# Patient Record
Sex: FEMALE | Employment: OTHER | URBAN - METROPOLITAN AREA
[De-identification: names, ages, dates, MRNs, and addresses within clinical notes are randomized per-mention and may not be internally consistent; named-entity substitution may affect disease eponyms.]

---

## 2017-12-28 ENCOUNTER — HOSPITAL ENCOUNTER (EMERGENCY)
Facility: CLINIC | Age: 67
Discharge: HOME | End: 2017-12-28
Attending: EMERGENCY MEDICINE | Admitting: EMERGENCY MEDICINE
Payer: COMMERCIAL

## 2017-12-28 ENCOUNTER — APPOINTMENT (OUTPATIENT)
Dept: GENERAL RADIOLOGY | Facility: CLINIC | Age: 67
End: 2017-12-28
Attending: EMERGENCY MEDICINE
Payer: COMMERCIAL

## 2017-12-28 VITALS
BODY MASS INDEX: 25.69 KG/M2 | WEIGHT: 154.2 LBS | HEIGHT: 65 IN | TEMPERATURE: 97.9 F | OXYGEN SATURATION: 97 % | RESPIRATION RATE: 16 BRPM | DIASTOLIC BLOOD PRESSURE: 72 MMHG | SYSTOLIC BLOOD PRESSURE: 151 MMHG | HEART RATE: 63 BPM

## 2017-12-28 DIAGNOSIS — K59.00 CONSTIPATION, UNSPECIFIED CONSTIPATION TYPE: ICD-10-CM

## 2017-12-28 DIAGNOSIS — F03.91 DEMENTIA WITH BEHAVIORAL DISTURBANCE, UNSPECIFIED DEMENTIA TYPE: ICD-10-CM

## 2017-12-28 LAB
ALBUMIN SERPL-MCNC: 3.4 G/DL (ref 3.4–5)
ALBUMIN UR-MCNC: NEGATIVE MG/DL
ALP SERPL-CCNC: 85 U/L (ref 40–150)
ALT SERPL W P-5'-P-CCNC: 15 U/L (ref 0–50)
ANION GAP SERPL CALCULATED.3IONS-SCNC: 6 MMOL/L (ref 3–14)
APPEARANCE UR: CLEAR
AST SERPL W P-5'-P-CCNC: 12 U/L (ref 0–45)
BACTERIA #/AREA URNS HPF: ABNORMAL /HPF
BASOPHILS # BLD AUTO: 0 10E9/L (ref 0–0.2)
BASOPHILS NFR BLD AUTO: 0.6 %
BILIRUB SERPL-MCNC: 0.8 MG/DL (ref 0.2–1.3)
BILIRUB UR QL STRIP: NEGATIVE
BUN SERPL-MCNC: 10 MG/DL (ref 7–30)
CALCIUM SERPL-MCNC: 9.3 MG/DL (ref 8.5–10.1)
CHLORIDE SERPL-SCNC: 108 MMOL/L (ref 94–109)
CO2 SERPL-SCNC: 28 MMOL/L (ref 20–32)
COLOR UR AUTO: YELLOW
CREAT SERPL-MCNC: 1 MG/DL (ref 0.52–1.04)
DIFFERENTIAL METHOD BLD: NORMAL
EOSINOPHIL # BLD AUTO: 0.1 10E9/L (ref 0–0.7)
EOSINOPHIL NFR BLD AUTO: 1.1 %
ERYTHROCYTE [DISTWIDTH] IN BLOOD BY AUTOMATED COUNT: 13.3 % (ref 10–15)
GFR SERPL CREATININE-BSD FRML MDRD: 55 ML/MIN/1.7M2
GLUCOSE SERPL-MCNC: 116 MG/DL (ref 70–99)
GLUCOSE UR STRIP-MCNC: NEGATIVE MG/DL
HCT VFR BLD AUTO: 39 % (ref 35–47)
HGB BLD-MCNC: 12.8 G/DL (ref 11.7–15.7)
HGB UR QL STRIP: NEGATIVE
IMM GRANULOCYTES # BLD: 0 10E9/L (ref 0–0.4)
IMM GRANULOCYTES NFR BLD: 0.2 %
KETONES UR STRIP-MCNC: NEGATIVE MG/DL
LEUKOCYTE ESTERASE UR QL STRIP: ABNORMAL
LIPASE SERPL-CCNC: 136 U/L (ref 73–393)
LYMPHOCYTES # BLD AUTO: 2 10E9/L (ref 0.8–5.3)
LYMPHOCYTES NFR BLD AUTO: 38.3 %
MCH RBC QN AUTO: 28.7 PG (ref 26.5–33)
MCHC RBC AUTO-ENTMCNC: 32.8 G/DL (ref 31.5–36.5)
MCV RBC AUTO: 87 FL (ref 78–100)
MONOCYTES # BLD AUTO: 0.4 10E9/L (ref 0–1.3)
MONOCYTES NFR BLD AUTO: 7.9 %
MUCOUS THREADS #/AREA URNS LPF: PRESENT /LPF
NEUTROPHILS # BLD AUTO: 2.7 10E9/L (ref 1.6–8.3)
NEUTROPHILS NFR BLD AUTO: 51.9 %
NITRATE UR QL: NEGATIVE
NRBC # BLD AUTO: 0 10*3/UL
NRBC BLD AUTO-RTO: 0 /100
PH UR STRIP: 7 PH (ref 5–7)
PLATELET # BLD AUTO: 207 10E9/L (ref 150–450)
POTASSIUM SERPL-SCNC: 3.9 MMOL/L (ref 3.4–5.3)
PROT SERPL-MCNC: 7.6 G/DL (ref 6.8–8.8)
RBC # BLD AUTO: 4.46 10E12/L (ref 3.8–5.2)
RBC #/AREA URNS AUTO: 3 /HPF (ref 0–2)
SODIUM SERPL-SCNC: 142 MMOL/L (ref 133–144)
SOURCE: ABNORMAL
SP GR UR STRIP: 1.01 (ref 1–1.03)
SQUAMOUS #/AREA URNS AUTO: 2 /HPF (ref 0–1)
TRANS CELLS #/AREA URNS HPF: 2 /HPF (ref 0–1)
TSH SERPL DL<=0.005 MIU/L-ACNC: 0.4 MU/L (ref 0.4–4)
UROBILINOGEN UR STRIP-MCNC: NORMAL MG/DL (ref 0–2)
WBC # BLD AUTO: 5.2 10E9/L (ref 4–11)
WBC #/AREA URNS AUTO: 81 /HPF (ref 0–2)

## 2017-12-28 PROCEDURE — 71020 XR CHEST 2 VW: CPT

## 2017-12-28 PROCEDURE — 74020 XR ABDOMEN 2 VW: CPT

## 2017-12-28 PROCEDURE — 96361 HYDRATE IV INFUSION ADD-ON: CPT | Performed by: EMERGENCY MEDICINE

## 2017-12-28 PROCEDURE — 81001 URINALYSIS AUTO W/SCOPE: CPT | Performed by: EMERGENCY MEDICINE

## 2017-12-28 PROCEDURE — 25000132 ZZH RX MED GY IP 250 OP 250 PS 637: Performed by: EMERGENCY MEDICINE

## 2017-12-28 PROCEDURE — 87086 URINE CULTURE/COLONY COUNT: CPT | Performed by: EMERGENCY MEDICINE

## 2017-12-28 PROCEDURE — 85025 COMPLETE CBC W/AUTO DIFF WBC: CPT | Performed by: EMERGENCY MEDICINE

## 2017-12-28 PROCEDURE — 83690 ASSAY OF LIPASE: CPT | Performed by: EMERGENCY MEDICINE

## 2017-12-28 PROCEDURE — 99284 EMERGENCY DEPT VISIT MOD MDM: CPT | Mod: 25 | Performed by: EMERGENCY MEDICINE

## 2017-12-28 PROCEDURE — 96360 HYDRATION IV INFUSION INIT: CPT | Performed by: EMERGENCY MEDICINE

## 2017-12-28 PROCEDURE — 99284 EMERGENCY DEPT VISIT MOD MDM: CPT | Mod: Z6 | Performed by: EMERGENCY MEDICINE

## 2017-12-28 PROCEDURE — 25000128 H RX IP 250 OP 636: Performed by: EMERGENCY MEDICINE

## 2017-12-28 PROCEDURE — 84443 ASSAY THYROID STIM HORMONE: CPT | Performed by: EMERGENCY MEDICINE

## 2017-12-28 PROCEDURE — 80053 COMPREHEN METABOLIC PANEL: CPT | Performed by: EMERGENCY MEDICINE

## 2017-12-28 RX ORDER — SULFAMETHOXAZOLE/TRIMETHOPRIM 800-160 MG
1 TABLET ORAL ONCE
Status: COMPLETED | OUTPATIENT
Start: 2017-12-28 | End: 2017-12-28

## 2017-12-28 RX ORDER — AMOXICILLIN 250 MG
1 CAPSULE ORAL 2 TIMES DAILY
Qty: 100 TABLET | Refills: 0 | Status: SHIPPED | OUTPATIENT
Start: 2017-12-28

## 2017-12-28 RX ORDER — AMOXICILLIN 250 MG
1 CAPSULE ORAL DAILY PRN
COMMUNITY
End: 2017-12-28

## 2017-12-28 RX ORDER — SENNOSIDES 8.6 MG
8.6 TABLET ORAL ONCE
Status: DISCONTINUED | OUTPATIENT
Start: 2017-12-28 | End: 2017-12-28 | Stop reason: HOSPADM

## 2017-12-28 RX ORDER — SULFAMETHOXAZOLE/TRIMETHOPRIM 800-160 MG
1 TABLET ORAL 2 TIMES DAILY
Qty: 14 TABLET | Refills: 0 | Status: SHIPPED | OUTPATIENT
Start: 2017-12-28

## 2017-12-28 RX ADMIN — SODIUM CHLORIDE 1000 ML: 900 INJECTION, SOLUTION INTRAVENOUS at 10:52

## 2017-12-28 RX ADMIN — SULFAMETHOXAZOLE AND TRIMETHOPRIM 1 TABLET: 800; 160 TABLET ORAL at 14:12

## 2017-12-28 ASSESSMENT — ENCOUNTER SYMPTOMS
APPETITE CHANGE: 1
VOMITING: 0
NAUSEA: 0
BLOOD IN STOOL: 0
ANAL BLEEDING: 0
DIARRHEA: 0
ABDOMINAL PAIN: 0

## 2017-12-28 NOTE — LETTER
Transition Communication Hand-off for Care Transitions to Next Level of Care Provider    Name: Ghazala Maharaj  MRN #: [id number]  Primary Care Provider: Goldie Thakur     Primary Clinic: [address]  [facility] [address]     Reason for Hospitalization:  dementia with behavioral disturbance  Admit Date/Time: 12/28/2017 10:24 AM  Discharge Date: 12/28/17  Payor Source: Payor: [employer] / Plan: [employer] / Product Type: POS /        Reason for Communication Hand-off Referral: Fragility  Other - progressive dementia, care needs increasing    Discharge Plan:  FYI - Ghazala was brought into [facility] ED by her sisters for concerns that she had stopped eating and drinking.  Her sister Priyanka noted that Ghazala is refusing to eat. Family is caring for her at home but it is becoming more difficult.  She was medically stable for d/c to home, able to take oral medication in the ED. MD ordered home care to evaluate home feeding.       ASPEN Martinez, [facility]  Social Work Services, Emergency Dept [facility]  Pager: [phone] Mon-Sat 9 am - 9 pm, on-call/after hours pager [phone]    AVS/Discharge Summary is the source of truth; this is a helpful guide for improved communication of patient story

## 2017-12-28 NOTE — ED PROVIDER NOTES
"  History     Chief Complaint   Patient presents with     Anorexia     HPI  Ghazala Maharaj is a 67 year old female who presents to the emergency department with her sister for evaluation of decreased PO intake.  Apparently the patient is demented and lives in a care facility.  The patient has been noted to have decreased PO intake and according to the daughter has not eaten much over the last 2 weeks.  The last time the patient took any food was approximately 4 days ago.  The patient still has been urinating daily and denies any pain, vomiting, diarrhea, melena, bright red blood per rectum.  The patient was brought into the ER for further evaluation.    I have reviewed the Medications, Allergies, Past Medical and Surgical History, and Social History in the [employer] system.    Past Medical History:   Diagnosis Date     Dementia      Hypertension        History reviewed. No pertinent surgical history.    No family history on file.    Social History   Substance Use Topics     Smoking status: Never Smoker     Smokeless tobacco: Never Used     Alcohol use No       Previous Medications    CALCIUM-VITAMIN D (CALTRATE) 600-400 MG-UNIT PER TABLET    Take 1 tablet by mouth daily    CALCIUM-VITAMIN D 500-125 MG-UNIT TABS    Take 2,000 Units by mouth    DONEPEZIL HCL PO    Take 10 mg by mouth daily (with breakfast)    LOSARTAN POTASSIUM PO    Take 100 mg by mouth daily    METOPROLOL SUCCINATE ER PO    Take 25 mg by mouth daily      No Known Allergies     Review of Systems   Constitutional: Positive for appetite change (Decreased PO intake for 2 weeks).   Cardiovascular: Negative for chest pain.   Gastrointestinal: Negative for abdominal pain, anal bleeding, blood in stool, diarrhea, nausea and vomiting.   All other systems reviewed and are negative.      Physical Exam   BP: 159/69  Pulse: 63  Temp: 97.9  F (36.6  C)  Resp: 16  Height: 165.1 cm (5' 5\")  Weight: 69.9 kg (154 lb 3.2 oz)  SpO2: 97 %      Physical Exam   Constitutional: "   Cooperative and calm but noncommunicative   HENT:   Head: Atraumatic.   Eyes: EOM are normal. Pupils are equal, round, and reactive to light.   Neck: Neck supple.   Cardiovascular: Normal heart sounds.    Pulmonary/Chest: Breath sounds normal.   Abdominal: Soft. There is no tenderness. There is no rebound.   Musculoskeletal: She exhibits no edema, tenderness or deformity.   Neurological:   Grossly symmetric   Skin: No rash noted.   Psychiatric:   Unable to assess secondary to her level of dementia       ED Course     ED Course     Procedures          Results for orders placed or performed during the hospital encounter of 12/28/17   XR Chest 2 Views    Narrative    XR CHEST 2 VW  12/28/2017 11:11 AM      HISTORY: weakness;     COMPARISON: 10/14/2005    FINDINGS: PA and lateral views of the chest. No acute airspace  opacities. No pneumothorax or pleural effusion. Heart size is normal.      Impression    IMPRESSION: No acute cardiopulmonary findings.    I have personally reviewed the examination and initial interpretation  and I agree with the findings.    EDWIN ECHEVERRIA MD ([place])   Abdomen XR, 2 vw, flat and upright    Narrative    Exam: Abdominal x-ray, 2 views, 12/28/2017.    COMPARISON: No similar study.    HISTORY: Decreased appetite.    FINDINGS: Upright and supine views of the abdomen were obtained.  Nonobstructive bowel gas pattern. Moderate stool burden throughout the  colon. Pelvic phleboliths. No pneumatosis. No portal venous gas. No  free air beneath the diaphragm. Bilateral lower lobes atelectasis  versus consolidation, left greater than right. Degenerative changes of  the spine and hips.      Impression    IMPRESSION:  1. Nonobstructive bowel gas pattern with moderate stool burden  throughout the colon.  2. Bilateral lower lobes atelectasis versus consolidation, left  greater than right.    ARUN LUNDBERG MD   CBC with platelets differential   Result Value Ref Range    WBC 5.2 4.0 - 11.0 10e9/L     RBC Count 4.46 3.8 - 5.2 10e12/L    Hemoglobin 12.8 11.7 - 15.7 g/dL    Hematocrit 39.0 35.0 - 47.0 %    MCV 87 78 - 100 fl    MCH 28.7 26.5 - 33.0 pg    MCHC 32.8 31.5 - 36.5 g/dL    RDW 13.3 10.0 - 15.0 %    Platelet Count 207 150 - 450 10e9/L    Diff Method Automated Method     % Neutrophils 51.9 %    % Lymphocytes 38.3 %    % Monocytes 7.9 %    % Eosinophils 1.1 %    % Basophils 0.6 %    % Immature Granulocytes 0.2 %    Nucleated RBCs 0 0 /100    Absolute Neutrophil 2.7 1.6 - 8.3 10e9/L    Absolute Lymphocytes 2.0 0.8 - 5.3 10e9/L    Absolute Monocytes 0.4 0.0 - 1.3 10e9/L    Absolute Eosinophils 0.1 0.0 - 0.7 10e9/L    Absolute Basophils 0.0 0.0 - 0.2 10e9/L    Abs Immature Granulocytes 0.0 0 - 0.4 10e9/L    Absolute Nucleated RBC 0.0    Comprehensive metabolic panel   Result Value Ref Range    Sodium 142 133 - 144 mmol/L    Potassium 3.9 3.4 - 5.3 mmol/L    Chloride 108 94 - 109 mmol/L    Carbon Dioxide 28 20 - 32 mmol/L    Anion Gap 6 3 - 14 mmol/L    Glucose 116 (H) 70 - 99 mg/dL    Urea Nitrogen 10 7 - 30 mg/dL    Creatinine 1.00 0.52 - 1.04 mg/dL    GFR Estimate 55 (L) >60 mL/min/1.7m2    GFR Estimate If Black 67 >60 mL/min/1.7m2    Calcium 9.3 8.5 - 10.1 mg/dL    Bilirubin Total 0.8 0.2 - 1.3 mg/dL    Albumin 3.4 3.4 - 5.0 g/dL    Protein Total 7.6 6.8 - 8.8 g/dL    Alkaline Phosphatase 85 40 - 150 U/L    ALT 15 0 - 50 U/L    AST 12 0 - 45 U/L   Lipase   Result Value Ref Range    Lipase 136 73 - 393 U/L   TSH   Result Value Ref Range    TSH 0.40 0.40 - 4.00 mU/L   UA with Microscopic reflex to Culture   Result Value Ref Range    Color Urine Yellow     Appearance Urine Clear     Glucose Urine Negative NEG^Negative mg/dL    Bilirubin Urine Negative NEG^Negative    Ketones Urine Negative NEG^Negative mg/dL    Specific Gravity Urine 1.008 1.003 - 1.035    Blood Urine Negative NEG^Negative    pH Urine 7.0 5.0 - 7.0 pH    Protein Albumin Urine Negative NEG^Negative mg/dL    Urobilinogen mg/dL Normal 0.0 - 2.0  mg/dL    Nitrite Urine Negative NEG^Negative    Leukocyte Esterase Urine Large (A) NEG^Negative    Source Midstream Urine     WBC Urine 81 (H) 0 - 2 /HPF    RBC Urine 3 (H) 0 - 2 /HPF    Bacteria Urine Few (A) NEG^Negative /HPF    Squamous Epithelial /HPF Urine 2 (H) 0 - 1 /HPF    Transitional Epi 2 (H) 0 - 1 /HPF    Mucous Urine Present (A) NEG^Negative /LPF   Urine Culture Aerobic Bacterial   Result Value Ref Range    Specimen Description Unspecified Urine     Special Requests Specimen received in preservative     Culture Micro PENDING        Labs Ordered and Resulted from Time of ED Arrival Up to the Time of Departure from the ED   COMPREHENSIVE METABOLIC PANEL - Abnormal; Notable for the following:        Result Value    Glucose 116 (*)     GFR Estimate 55 (*)     All other components within normal limits   ROUTINE UA WITH MICROSCOPIC REFLEX TO CULTURE - Abnormal; Notable for the following:     Leukocyte Esterase Urine Large (*)     WBC Urine 81 (*)     RBC Urine 3 (*)     Bacteria Urine Few (*)     Squamous Epithelial /HPF Urine 2 (*)     Transitional Epi 2 (*)     Mucous Urine Present (*)     All other components within normal limits   CBC WITH PLATELETS DIFFERENTIAL   LIPASE   TSH   PERIPHERAL IV CATHETER   URINE CULTURE AEROBIC BACTERIAL         Assessments & Plan (with Medical Decision Making)     I have reviewed the nursing notes.    Patient was seen by our  in the ER for her needs and level of care at home.  Meanwhile the patient's medical workup revealed the patient to be constipated and with possibly a UTI.  The patient was able to take oral medication here in the ER.  Medical evaluation revealed no significant dehydration indicating the patient has been taking orally at home over the last 2 weeks.  Treatment of her constipation and UTI may result in an increased appetite and this was communicated with the patient's family member.    I have reviewed the findings, diagnosis, plan and need  for follow up with the patient.    Patient's Medications   New Prescriptions    SULFAMETHOXAZOLE-TRIMETHOPRIM (BACTRIM DS/SEPTRA DS) 800-160 MG PER TABLET    Take 1 tablet by mouth 2 times daily   Previous Medications    CALCIUM-VITAMIN D (CALTRATE) 600-400 MG-UNIT PER TABLET    Take 1 tablet by mouth daily    CALCIUM-VITAMIN D 500-125 MG-UNIT TABS    Take 2,000 Units by mouth    DONEPEZIL HCL PO    Take 10 mg by mouth daily (with breakfast)    LOSARTAN POTASSIUM PO    Take 100 mg by mouth daily    METOPROLOL SUCCINATE ER PO    Take 25 mg by mouth daily   Modified Medications    Modified Medication Previous Medication    SENNA-DOCUSATE (SENOKOT-S;PERICOLACE) 8.6-50 MG PER TABLET senna-docusate (SENOKOT-S;PERICOLACE) 8.6-50 MG per tablet       Take 1 tablet by mouth 2 times daily    Take 1 tablet by mouth daily as needed for constipation   Discontinued Medications    No medications on file       Final diagnoses:   Dementia with behavioral disturbance, unspecified dementia type   Constipation, unspecified constipation type     Keep hydrated.    Take your medications as prescribed.    Please make an appointment to follow up with Your Primary Care Provider in one week for recheck.    Our Care Coordinator will arrange for home care visits to assess for assistance with activities of daily living.    MD NITESH Huntley Christopher M. Kirby, am serving as a trained medical scribe to document services personally performed by Cornel Boyer MD, based on the provider's statements to me.   Cornel DOWLING MD, was physically present and have reviewed and verified the accuracy of this note documented by Jerry Weeks.     12/28/2017   [facility], EMERGENCY DEPARTMENT     Cornel Boyer MD  12/28/17 5211

## 2017-12-28 NOTE — ED AVS SNAPSHOT
"    [facility], [place], EMERGENCY DEPARTMENT: [phone]                                              INTERAGENCY TRANSFER FORM - LAB / IMAGING / EKG / EMG RESULTS   2017                    Hospital Admission Date: 2017  INNA PEREZ   : 1950  Sex: Female        Attending Provider: Cornel Boyer MD     Allergies:  No Known Allergies    Infection:  None   Service:  None    Ht:  1.651 m (5' 5\")   Wt:  69.9 kg (154 lb 3.2 oz)   Admission Wt:  69.9 kg (154 lb 3.2 oz)    BMI:  25.66 kg/m 2   BSA:  1.79 m 2            Patient PCP Information     Provider PCP Type    [facility]         Lab Results - 3 Days      Urine Culture Aerobic Bacterial [[id number]]  Resulted: 17 1333, Result status: Preliminary result    Ordering provider: Cornel Boyer MD  17 1200 Resulting lab: [facility]    Specimen Information    Type Source Collected On   Unspecified Urine  17 1200          Components       Value Reference Range Flag Lab   Specimen Description Unspecified Urine      Special Requests Specimen received in preservative   75   Culture Micro PENDING               UA with Microscopic reflex to Culture [[id number]] (Abnormal)  Resulted: 17 1254, Result status: Final result    Ordering provider: Cornel Boyer MD  17 1044 Resulting lab: [facility]    Specimen Information    Type Source Collected On   Midstream Urine Urine Midstream 17 1200          Components       Value Reference Range Flag Lab   Color Urine Yellow   51   Appearance Urine Clear   51   Glucose Urine Negative NEG^Negative mg/dL  51   Bilirubin Urine Negative NEG^Negative  51   Ketones Urine Negative NEG^Negative mg/dL  51   Specific Gravity Urine 1.008 1.003 - 1.035  51   Blood Urine Negative NEG^Negative  51   pH Urine 7.0 5.0 - 7.0 pH  51   Protein Albumin Urine Negative NEG^Negative mg/dL  51 "   Urobilinogen mg/dL Normal 0.0 - 2.0 mg/dL  51   Nitrite Urine Negative NEG^Negative  51   Leukocyte Esterase Urine Large NEG^Negative A 51   Source Midstream Urine   51   WBC Urine 81 0 - 2 /HPF H 51   RBC Urine 3 0 - 2 /HPF H 51   Bacteria Urine Few NEG^Negative /HPF A 51   Squamous Epithelial /HPF Urine 2 0 - 1 /HPF H 51   Transitional Epi 2 0 - 1 /HPF H 51   Mucous Urine Present NEG^Negative /LPF A 51            TSH [[id number]]  Resulted: 12/28/17 1131, Result status: Final result    Ordering provider: Cornel Boyer MD  12/28/17 1044 Resulting lab: [facility]    Specimen Information    Type Source Collected On   Blood  12/28/17 1056          Components       Value Reference Range Flag Lab   TSH 0.40 0.40 - 4.00 mU/L  51            Comprehensive metabolic panel [[id number]] (Abnormal)  Resulted: 12/28/17 1127, Result status: Final result    Ordering provider: Cornel Boyer MD  12/28/17 1044 Resulting lab: [facility]    Specimen Information    Type Source Collected On   Blood  12/28/17 1056          Components       Value Reference Range Flag Lab   Sodium 142 133 - 144 mmol/L  51   Potassium 3.9 3.4 - 5.3 mmol/L  51   Chloride 108 94 - 109 mmol/L  51   Carbon Dioxide 28 20 - 32 mmol/L  51   Anion Gap 6 3 - 14 mmol/L  51   Glucose 116 70 - 99 mg/dL H 51   Urea Nitrogen 10 7 - 30 mg/dL  51   Creatinine 1.00 0.52 - 1.04 mg/dL  51   GFR Estimate 55 >60 mL/min/1.7m2 L 51   Comment:  Non  GFR Calc   GFR Estimate If Black 67 >60 mL/min/1.7m2  51   Comment:  African American GFR Calc   Calcium 9.3 8.5 - 10.1 mg/dL  51   Bilirubin Total 0.8 0.2 - 1.3 mg/dL  51   Albumin 3.4 3.4 - 5.0 g/dL  51   Protein Total 7.6 6.8 - 8.8 g/dL  51   Alkaline Phosphatase 85 40 - 150 U/L  51   ALT 15 0 - 50 U/L  51   AST 12 0 - 45 U/L  51            Lipase [[id number]]  Resulted: 12/28/17 1127, Result status: Final result    Ordering  provider: Cornel Boyer MD  12/28/17 1044 Resulting lab: [facility]    Specimen Information    Type Source Collected On   Blood  12/28/17 1056          Components       Value Reference Range Flag Lab   Lipase 136 73 - 393 U/L  51            CBC with platelets differential [[id number]]  Resulted: 12/28/17 1106, Result status: Final result    Ordering provider: Cornel Boyer MD  12/28/17 1044 Resulting lab: [facility]    Specimen Information    Type Source Collected On   Blood  12/28/17 1056          Components       Value Reference Range Flag Lab   WBC 5.2 4.0 - 11.0 10e9/L  51   RBC Count 4.46 3.8 - 5.2 10e12/L  51   Hemoglobin 12.8 11.7 - 15.7 g/dL  51   Hematocrit 39.0 35.0 - 47.0 %  51   MCV 87 78 - 100 fl  51   MCH 28.7 26.5 - 33.0 pg  51   MCHC 32.8 31.5 - 36.5 g/dL  51   RDW 13.3 10.0 - 15.0 %  51   Platelet Count 207 150 - 450 10e9/L  51   Diff Method Automated Method   51   % Neutrophils 51.9 %  51   % Lymphocytes 38.3 %  51   % Monocytes 7.9 %  51   % Eosinophils 1.1 %  51   % Basophils 0.6 %  51   % Immature Granulocytes 0.2 %  51   Nucleated RBCs 0 0 /100  51   Absolute Neutrophil 2.7 1.6 - 8.3 10e9/L  51   Absolute Lymphocytes 2.0 0.8 - 5.3 10e9/L  51   Absolute Monocytes 0.4 0.0 - 1.3 10e9/L  51   Absolute Eosinophils 0.1 0.0 - 0.7 10e9/L  51   Absolute Basophils 0.0 0.0 - 0.2 10e9/L  51   Abs Immature Granulocytes 0.0 0 - 0.4 10e9/L  51   Absolute Nucleated RBC 0.0   51            Testing Performed By     Lab - Abbreviation Name Director Address Valid Date Range    51 - Unknown [facility] Unknown [address] [facility] [address] 12/31/14 1010 - Present    75 - Unknown [facility] Unknown [address] [facility] [address] 01/15/15 1019 - Present            Unresulted Labs     None         Imaging Results - 3 Days      Abdomen XR, 2 vw, flat and  upright [[id number]]  Resulted: 12/28/17 1335, Result status: Final result    Ordering provider: Cornel Boyer MD  12/28/17 1254 Resulted by: Jennifer Lundberg MD    Performed: 12/28/17 1316 - 12/28/17 1329 Resulting lab: RADIOLOGY RESULTS    Narrative:       Exam: Abdominal x-ray, 2 views, 12/28/2017.    COMPARISON: No similar study.    HISTORY: Decreased appetite.    FINDINGS: Upright and supine views of the abdomen were obtained.  Nonobstructive bowel gas pattern. Moderate stool burden throughout the  colon. Pelvic phleboliths. No pneumatosis. No portal venous gas. No  free air beneath the diaphragm. Bilateral lower lobes atelectasis  versus consolidation, left greater than right. Degenerative changes of  the spine and hips.      Impression:       IMPRESSION:  1. Nonobstructive bowel gas pattern with moderate stool burden  throughout the colon.  2. Bilateral lower lobes atelectasis versus consolidation, left  greater than right.    JENNIFER LUNDBERG MD      XR Chest 2 Views [[id number]]  Resulted: 12/28/17 1128, Result status: Final result    Ordering provider: Cornel Boyer MD  12/28/17 1044 Resulted by: Edwin Echeverria MD [facility]    Performed: 12/28/17 1059 - 12/28/17 1111 Resulting lab: RADIOLOGY RESULTS    Narrative:       XR CHEST 2 VW  12/28/2017 11:11 AM      HISTORY: weakness;     COMPARISON: 10/14/2005    FINDINGS: PA and lateral views of the chest. No acute airspace  opacities. No pneumothorax or pleural effusion. Heart size is normal.      Impression:       IMPRESSION: No acute cardiopulmonary findings.    I have personally reviewed the examination and initial interpretation  and I agree with the findings.    EDWIN ECHEVERRIA MD ([place])      Testing Performed By     Lab - Abbreviation Name Director Address Valid Date Range    104 - Rad Rslts RADIOLOGY RESULTS Unknown Unknown 02/16/05 1553 - Present            Encounter-Level Documents:     There are no  encounter-level documents.      Order-Level Documents:     There are no order-level documents.

## 2017-12-28 NOTE — ED NOTES
Pt arrives with her sister to be evaluated for not eating or drinking for over one week. Has a H/O dementia. Pt is normally seen at the [facility], sees Dr. Montanez.

## 2017-12-28 NOTE — DISCHARGE INSTRUCTIONS
Keep hydrated.    Take your medications as prescribed.    Please make an appointment to follow up with Your Primary Care Provider in one week for recheck.    Our Care Coordinator will arrange for home care visits to assess for assistance with activities of daily living.

## 2017-12-28 NOTE — PROGRESS NOTES
Care Coordinator Progress Note     Admission Date/Time:  12/28/2017  Attending MD:  Dr Cornel Boyer    Emergency Department Visit     Data  Received a call from SAHRA Martinez. Pt is in the ER; plan is discharge back home with family; needs home care referral for nursing & OT. Breanna spoke with family and the contact is Priyanka Maharaj, phone: [phone]. Priyanka is pt's sister and her PCA. Priyanka speaks English. Pt has [email or web address] insurance and her primary is thru ; will make referral to home care thru  [facility].     Pt was seen in the ER for:  anorexia. Decreased oral intake for the last 2 weeks; last ate 4 days ago. Pt has dementia.   Dementia with behavioral disturbance, unspecified dementia type  Constipation, unspecified constipation type.    Concerns with insurance coverage for discharge needs: None. Pt's insurance is [employer].   Current Living Situation: Patient lives with family.  Support System: Supportive  Services Involved: PCA  Transportation: Family or Friend will provide  Barriers to Discharge: None    Coordination of Care and Referrals  Chart reviewed. Reviewed SW note from today.   Referral for home RN & OT called to [facility]. Phone: [phone]. They said to fax information to Fax# [phone]. Faxed facesheet, home RN & OT orders; SAHRA note; MD note and lab results. Received call back from [facility] and they needed Face to Face; this was completed and faxed to them.   Noted pt's primary is Dr Brayden Montanez at [email or web address].      Assessment  Pt with decreased oral intake; hx of dementia. Has care provided by family and PCA.      Plan  Anticipated Discharge Date:  Today  Anticipated Discharge Plan:   Discharge home with family. [facility] will contact family regarding home care visit.       Rena Sweet RN Care Coordinator  [address], [facility]    (covering for Radha Schultz RN CC, [facility])

## 2017-12-28 NOTE — ED AVS SNAPSHOT
[facility], Emergency Department    [address] [address]    Phone:  [phone]                                       Ghazala Maharaj   MRN: [id number]    Department:  [facility], Emergency Department   Date of Visit:  12/28/2017           Patient Information     Date Of Birth          1950        Your diagnoses for this visit were:     Dementia with behavioral disturbance, unspecified dementia type     Constipation, unspecified constipation type        You were seen by Cornel Boyer MD.        Discharge Instructions       Keep hydrated.    Take your medications as prescribed.    Please make an appointment to follow up with Your Primary Care Provider in one week for recheck.    Our Care Coordinator will arrange for home care visits to assess for assistance with activities of daily living.    Discharge References/Attachments     DEMENTIA, CARING FOR END-STAGE (ENGLISH)    CONSTIPATION, TREATING (ENGLISH)    BLADDER INFECTION, FEMALE (ADULT) (ENGLISH)      24 Hour Appointment Hotline       To make an appointment at any [facility], call [phone] ([phone]). If you don't have a family doctor or clinic, we will help you find one. [facility] clinics are conveniently located to serve the needs of you and your family.             Review of your medicines      START taking        Dose / Directions Last dose taken    sulfamethoxazole-trimethoprim 800-160 MG per tablet   Commonly known as:  BACTRIM DS/SEPTRA DS   Dose:  1 tablet   Quantity:  14 tablet        Take 1 tablet by mouth 2 times daily   Refills:  0          CONTINUE these medicines which may have CHANGED, or have new prescriptions. If we are uncertain of the size of tablets/capsules you have at home, strength may be listed as something that might have changed.        Dose / Directions Last dose taken    senna-docusate 8.6-50 MG per tablet   Commonly known as:  SENOKOT-S;PERICOLACE   Dose:  1 tablet   What  changed:    - when to take this  - reasons to take this   Quantity:  100 tablet        Take 1 tablet by mouth 2 times daily   Refills:  0          Our records show that you are taking the medicines listed below. If these are incorrect, please call your family doctor or clinic.        Dose / Directions Last dose taken    calcium-vitamin D 500-125 MG-UNIT Tabs   Dose:  2000 Units        Take 2,000 Units by mouth   Refills:  0        calcium-vitamin D 600-400 MG-UNIT per tablet   Commonly known as:  CALTRATE   Dose:  1 tablet        Take 1 tablet by mouth daily   Refills:  0        DONEPEZIL HCL PO   Dose:  10 mg        Take 10 mg by mouth daily (with breakfast)   Refills:  0        LOSARTAN POTASSIUM PO   Dose:  100 mg        Take 100 mg by mouth daily   Refills:  0        METOPROLOL SUCCINATE ER PO   Dose:  25 mg        Take 25 mg by mouth daily   Refills:  0                Prescriptions were sent or printed at these locations (2 Prescriptions)                   Other Prescriptions                Printed at Department/Unit printer (2 of 2)         senna-docusate (SENOKOT-S;PERICOLACE) 8.6-50 MG per tablet               sulfamethoxazole-trimethoprim (BACTRIM DS/SEPTRA DS) 800-160 MG per tablet                Procedures and tests performed during your visit     Abdomen XR, 2 vw, flat and upright    CBC with platelets differential    Comprehensive metabolic panel    Lipase    Peripheral IV catheter    TSH    UA with Microscopic reflex to Culture    Urine Culture Aerobic Bacterial    XR Chest 2 Views      Orders Needing Specimen Collection     None      Pending Results     Date and Time Order Name Status Description    12/28/2017 1200 Urine Culture Aerobic Bacterial Preliminary             Pending Culture Results     Date and Time Order Name Status Description    12/28/2017 1200 Urine Culture Aerobic Bacterial Preliminary             Pending Results Instructions     If you had any lab results that were not finalized at  "the time of your Discharge, you can call the ED Lab Result RN at [phone]. You will be contacted by this team for any positive Lab results or changes in treatment. The nurses are available 7 days a week from 10A to 6:30P.  You can leave a message 24 hours per day and they will return your call.        Thank you for choosing [facility]       Thank you for choosing [facility] for your care. Our goal is always to provide you with excellent care. Hearing back from our patients is one way we can continue to improve our services. Please take a few minutes to complete the written survey that you may receive in the mail after you visit with us. Thank you!        [employer] Information     [facility] lets you send messages to your doctor, view your test results, renew your prescriptions, schedule appointments and more. To sign up, go to [email or web address] . Click on \"Log in\" on the left side of the screen, which will take you to the Welcome page. Then click on \"Sign up Now\" on the right side of the page.     You will be asked to enter the access code listed below, as well as some personal information. Please follow the directions to create your username and password.     Your access code is: [email or web address]  Expires: 3/28/2018  2:26 PM     Your access code will  in 90 days. If you need help or a new code, please call your [facility] clinic or [phone].        Care EveryWhere ID     This is your Care EveryWhere ID. This could be used by other organizations to access your [facility] medical records  [id number]        Equal Access to Services     BRENDON SHERIDAN : Haddann Al, waaxda luqadaha, qaybta kaalmada shira, ruben euceda. So [facility] [phone].    ATENCIÓN: Si habla español, tiene a sal disposición servicios gratuitos de asistencia lingüística. Llame al [phone].    We comply with applicable federal civil rights laws and [place] laws. We do not discriminate on the " basis of race, color, national origin, age, disability, sex, sexual orientation, or gender identity.            After Visit Summary       This is your record. Keep this with you and show to your community pharmacist(s) and doctor(s) at your next visit.

## 2017-12-28 NOTE — PROGRESS NOTES
"Social Work: Assessment with Discharge Plan    Patient Name:  Ghazala Maharaj  :  1950  Age:  67 year old  MRN:  [id number]  Risk/Complexity Score:     Completed assessment with:  Chart review, patient, patient's sister Priyanka    Presenting Information   Reason for Referral:  Discharge plan  Date of Intake:  2017  Referral Source:  Physician  Decision Maker:  Spouse Escobar Valencia (p) [phone]  Alternate Decision Maker:  Son Myke Hyman (p) [phone]  Health Care Directive:  None in chart, patient has dementia and unable to complete HCD  Living Situation:  Apartment  Previous Functional Status:  [facility], Meals:  family, Laundry:  family, Housekeeping:  family, Bathing:  family, Medication Management:  family, Appointments:  family, Financial:  family and Other:  family PCA  Patient and family understanding of hospitalization:  \"She is not eating.\"  Cultural/Language/Spiritual Considerations:  [place], speaks [place]. She is largely illiterate, per sister. She does not speak English.   Adjustment to Illness:  Patient resting, participates minimally in conversation    Physical Health  Reason for Admission:  No diagnosis found.  Services Needed/Recommended:  Home with homecare    Mental Health/Chemical Dependency  Diagnosis:  None reported.  Support/Services in Place:  none  Services Needed/Recommended:  none    Support System  Significant relationship at present time:  Sister, spouse  Family of origin is available for support:  yes  Other support available:  Family as PCA   Gaps in support system:  none  Patient is caregiver to:  None     Provider Information   Primary Care Physician:  Goldie Thakur   [phone]   Clinic:  [address] [place] [address] / [facility] [place] [address]      :  Starla Bailey (p) [phone]    Financial   Income Source:  Not discussed  Financial Concerns:  None " reported.  Insurance:    Payor/Plan Subscriber Name Rel Member # Group #   [employer] INNA PEREZ  [id number]      [address]       Discharge Plan   Patient and family discharge goal:  Home with family/PCA support  Provided education on discharge plan:  YES  Patient agreeable to discharge plan:  YES  A list of Medicare Certified Facilities was provided to the patient and/or family to encourage patient choice. Patient's choices for facility are:  Not discussed at this time  Will NH provide Skilled rehabilitation or complex medical:  Not discussed at this time  General information regarding anticipated insurance coverage and possible out of pocket cost was discussed. Patient and patient's family are aware patient may incur the cost of transportation to the facility, pending insurance payment: YES  Barriers to discharge:  TBD - Pending patient's progress and further recommendation.    Discharge Recommendations   Anticipated Disposition:  Home with services  Transportation Needs:  Family:  sister Priyanka here in the ED with patient  Name of Transportation Company and Phone:  -    Additional comments   SW consulted by ED MD as patient's family reporting she is 'not eating'. SW met with patient, Inna, along with her sister, Priyanka. Sister Priyanka provides interpretation since Inna only speaks [place]. Inna appears fatigued, minimally responsive to conversation. Per sister, she has advanced dementia and speaks minimally.     Inna is a 67 year old [place]  female who typically resides in an apartment along with her spouse and other family members. Inna has advanced dementia per sister and requires daily caregiving. Inna attends adult day care twice weekly, otherwise family and agency PCA cares for her. She has 9.5 hours/day PCA coverage.     Inna's sister expressed primary concern of Inna not eating. She says Inna is not eating but upon further clarification it sounds as though she is eating  small bites and taking small sips, nothing substantial. She shares that Ghazala often closes her mouth and refuses food. Family has been worried about this for past 2 weeks and wondering how to help her. Family has been pureeing food and spoon food feeding her, noted that if any small lumps, Ghazala will spit out food. Discussed whether home care would be beneficial for skilled services (RN, OT, SLP) for feeding eval. Family also wondering if this is part of dementia progression.     Provided supportive counseling to Ghazala's sister. She is actively grieving that her sister is no longer independent and 'isn't the person she used to be'. She shared that Ghazala was always helping and taking care of others, she is well loved. We discussed what Ghazala's goals would be, but sister was unsure. She is struggling as Ghazala has had a hard life and     Plan: Ghazala has been medically cleared for home d/c. She was able to take oral antibiotic while in ED. Discussed with ED and he will include home care orders for skilled RN and OT to evaluated home needs and feeding needs. ED SW updated RN CC for assistance with home care. ED SW also left message for Ghazala's MSHO. CTS update sent to Ghazala's [facility]CP [facility].    ASPEN Martinez, [facility]  Social Work Services, Emergency Dept [facility]  Pager: [phone] Mon-Sat 9 am - 9 pm, on-call/after hours pager [phone]

## 2017-12-28 NOTE — ED NOTES
Patient/family refusing to wait for sennakot, states they will take at home. DC instructions reviewed.

## 2017-12-29 LAB
BACTERIA SPEC CULT: NORMAL
Lab: NORMAL
SPECIMEN SOURCE: NORMAL

## 2024-09-05 ENCOUNTER — ENROLLMENT (OUTPATIENT)
Dept: HOME HEALTH SERVICES | Facility: HOME HEALTH | Age: 74
End: 2024-09-05
Payer: MEDICARE

## 2024-09-26 NOTE — PROGRESS NOTES
04/28/2023: PT HAS COVERAGE FOR ENTERAL AS LONG AS VIA TUBE. SV    PT HAS LINE CARE AND HYDRATION COVERAGE.   NO TPA COVERAGE.   OTHER THERAPY: RETURN TO INTAKE FOR COVERAGE DETERMINATION    CAN BE IVN. HB VS NON HB DOES NOT MATTER WITH [facility]

## 2024-10-25 ENCOUNTER — HOME INFUSION (OUTPATIENT)
Dept: HOME HEALTH SERVICES | Facility: HOME HEALTH | Age: 74
End: 2024-10-25
Payer: MEDICARE

## 2024-10-25 ENCOUNTER — HOME INFUSION BILLING (OUTPATIENT)
Dept: HOME HEALTH SERVICES | Facility: HOME HEALTH | Age: 74
End: 2024-10-25
Payer: MEDICARE

## 2024-10-25 DIAGNOSIS — Z93.1 GASTROSTOMY STATUS (H): ICD-10-CM

## 2024-10-25 DIAGNOSIS — R40.3 PERSISTENT VEGETATIVE STATE (H): ICD-10-CM

## 2024-10-25 DIAGNOSIS — R62.7 ADULT FAILURE TO THRIVE: Primary | ICD-10-CM

## 2024-10-25 RX ORDER — NUTRITIONAL SUPPLEMENT/FIBER
300 LIQUID (ML) ORAL
Qty: 135000 ML | Refills: 3 | Status: ACTIVE | OUTPATIENT
Start: 2024-10-25 | End: 2025-10-25

## 2024-11-13 ENCOUNTER — HOME INFUSION BILLING (OUTPATIENT)
Dept: HOME HEALTH SERVICES | Facility: HOME HEALTH | Age: 74
End: 2024-11-13
Payer: MEDICARE

## 2024-11-13 ENCOUNTER — HOME INFUSION (OUTPATIENT)
Dept: HOME HEALTH SERVICES | Facility: HOME HEALTH | Age: 74
End: 2024-11-13
Payer: MEDICARE

## 2024-11-16 ENCOUNTER — TELEPHONE (OUTPATIENT)
Dept: HOME HEALTH SERVICES | Facility: HOME HEALTH | Age: 74
End: 2024-11-16
Payer: MEDICARE

## 2024-11-16 ENCOUNTER — HOME INFUSION BILLING (OUTPATIENT)
Dept: HOME HEALTH SERVICES | Facility: HOME HEALTH | Age: 74
End: 2024-11-16
Payer: MEDICARE

## 2024-11-16 NOTE — TELEPHONE ENCOUNTER
Triage Note/Care Coordination    Identify the person you spoke with and their relationship to the patient: sister    Reason for the call: Medication problem/concern    Medication-Related Issues    Therapy (check all that apply): HPN    Dose frequency (check all that apply): Once daily    Interventions: Medication sent    Additional comments: Did not receive delivery 11/15/24    Enteral-Related Issues    Type of therapy: Patient able to eat and drink    Describe the problem: Other: no delivery made    Interventions: Other: delivery set up     Other comments: Pt sister call stating that no delivery was made 11/15/24 and unable to do feed this morning 11/16. Barbie contacted and delivery set up for 11/16/24     Outcomes:     What is the plan for ongoing care of this patient: Problem resolved, patient will remain in home    Was there harm, averted harm, or unexpected death of the patient? No    Does the patient/caregiver verbalize agreement with the plan? YES        Follow-Up Communication    None

## 2024-11-20 ENCOUNTER — HOME INFUSION BILLING (OUTPATIENT)
Dept: HOME HEALTH SERVICES | Facility: HOME HEALTH | Age: 74
End: 2024-11-20
Payer: MEDICARE

## 2024-12-09 ENCOUNTER — HOME INFUSION (OUTPATIENT)
Dept: HOME HEALTH SERVICES | Facility: HOME HEALTH | Age: 74
End: 2024-12-09
Payer: COMMERCIAL

## 2024-12-09 NOTE — PROGRESS NOTES
"[facility] Nutrition Follow Up     Anthropometrics/Weight Goals  Height: 1.626 m (5' 4\")  Weight: 72 kg (158 lb 11.7 oz)  IBW Female (kg): 54.2  BMI (kg): 27.25  FHI Dosing Weight: 46 kg (101 lb 6.6 oz)    Nutrition and Medical History  Reason for Nutrition Support: NPO  Feeding tube type: G-Tube    Current Nutrition Orders  Oral Diet: NPO    Enteral Nutrition Orders  Enteral Formula: Compleat Organic Blends-Plant Based Blend  Rate/Frequency: 5 pouches/day  Water Flushes: 50ml water before and after each feeding + 150ml water q 2 hrs during awake hours  Enteral Nutrition Provides: 1900kcals, 95gm protein, 1210ml free water + flushes    Implementation  Intervention: Pt's sister Smiley called to report pt's BG was 132.  She is concerned it is too high. Discussed be sure feeds are off and wait 2 hours before checking BGs.  Pt currently gets 2 pouches of formula/day=82gm CHO per day which is a low amount of CHO in pt's diet.  Discussed would not recommend change in formula due to pt's elevated BG  Goals: Obtain weight.  Increase feeds toward goal    Plan  Follow up/Recommendations: Continue current TFs.  Obtiain weight to assess pt's estimated needs      Molly Siemens, RD, CNSC, LD  [facility] Dietitian    "

## 2024-12-30 ENCOUNTER — HOME INFUSION BILLING (OUTPATIENT)
Dept: HOME HEALTH SERVICES | Facility: HOME HEALTH | Age: 74
End: 2024-12-30
Payer: COMMERCIAL

## 2025-01-03 ENCOUNTER — HOME INFUSION (OUTPATIENT)
Dept: HOME HEALTH SERVICES | Facility: HOME HEALTH | Age: 75
End: 2025-01-03
Payer: COMMERCIAL

## 2025-01-09 ENCOUNTER — HOME INFUSION BILLING (OUTPATIENT)
Dept: HOME HEALTH SERVICES | Facility: HOME HEALTH | Age: 75
End: 2025-01-09
Payer: COMMERCIAL

## 2025-01-22 ENCOUNTER — HOME INFUSION (OUTPATIENT)
Dept: HOME HEALTH SERVICES | Facility: HOME HEALTH | Age: 75
End: 2025-01-22
Payer: COMMERCIAL

## 2025-02-03 ENCOUNTER — HOME INFUSION BILLING (OUTPATIENT)
Dept: HOME HEALTH SERVICES | Facility: HOME HEALTH | Age: 75
End: 2025-02-03
Payer: COMMERCIAL

## 2025-02-03 PROCEDURE — S9341 HIT ENTERAL GRAV DIEM: HCPCS

## 2025-02-03 PROCEDURE — B4149 EF BLENDERIZED FOODS: HCPCS

## 2025-02-04 PROCEDURE — S9341 HIT ENTERAL GRAV DIEM: HCPCS

## 2025-02-05 PROCEDURE — S9341 HIT ENTERAL GRAV DIEM: HCPCS

## 2025-02-06 PROCEDURE — S9341 HIT ENTERAL GRAV DIEM: HCPCS

## 2025-02-07 PROCEDURE — S9341 HIT ENTERAL GRAV DIEM: HCPCS

## 2025-02-08 PROCEDURE — S9341 HIT ENTERAL GRAV DIEM: HCPCS

## 2025-02-09 PROCEDURE — S9341 HIT ENTERAL GRAV DIEM: HCPCS

## 2025-02-10 PROCEDURE — S9341 HIT ENTERAL GRAV DIEM: HCPCS

## 2025-02-11 PROCEDURE — S9341 HIT ENTERAL GRAV DIEM: HCPCS

## 2025-02-12 PROCEDURE — S9341 HIT ENTERAL GRAV DIEM: HCPCS

## 2025-02-13 PROCEDURE — S9341 HIT ENTERAL GRAV DIEM: HCPCS

## 2025-02-14 PROCEDURE — S9341 HIT ENTERAL GRAV DIEM: HCPCS

## 2025-02-15 PROCEDURE — S9341 HIT ENTERAL GRAV DIEM: HCPCS

## 2025-02-16 PROCEDURE — S9341 HIT ENTERAL GRAV DIEM: HCPCS

## 2025-02-17 PROCEDURE — S9341 HIT ENTERAL GRAV DIEM: HCPCS

## 2025-02-18 PROCEDURE — S9341 HIT ENTERAL GRAV DIEM: HCPCS

## 2025-02-19 PROCEDURE — S9341 HIT ENTERAL GRAV DIEM: HCPCS

## 2025-02-20 PROCEDURE — S9341 HIT ENTERAL GRAV DIEM: HCPCS

## 2025-02-21 PROCEDURE — S9341 HIT ENTERAL GRAV DIEM: HCPCS

## 2025-02-22 PROCEDURE — S9341 HIT ENTERAL GRAV DIEM: HCPCS

## 2025-02-23 PROCEDURE — S9341 HIT ENTERAL GRAV DIEM: HCPCS

## 2025-02-24 PROCEDURE — S9341 HIT ENTERAL GRAV DIEM: HCPCS

## 2025-02-25 PROCEDURE — S9341 HIT ENTERAL GRAV DIEM: HCPCS

## 2025-02-26 PROCEDURE — S9341 HIT ENTERAL GRAV DIEM: HCPCS

## 2025-02-27 PROCEDURE — S9341 HIT ENTERAL GRAV DIEM: HCPCS

## 2025-02-28 PROCEDURE — S9341 HIT ENTERAL GRAV DIEM: HCPCS

## 2025-03-01 PROCEDURE — S9341 HIT ENTERAL GRAV DIEM: HCPCS

## 2025-03-02 PROCEDURE — S9341 HIT ENTERAL GRAV DIEM: HCPCS

## 2025-03-03 PROCEDURE — S9341 HIT ENTERAL GRAV DIEM: HCPCS

## 2025-03-04 PROCEDURE — S9341 HIT ENTERAL GRAV DIEM: HCPCS

## 2025-04-01 ENCOUNTER — HOME INFUSION BILLING (OUTPATIENT)
Dept: HOME HEALTH SERVICES | Facility: HOME HEALTH | Age: 75
End: 2025-04-01
Payer: COMMERCIAL

## 2025-04-09 ENCOUNTER — HOME INFUSION (OUTPATIENT)
Dept: HOME HEALTH SERVICES | Facility: HOME HEALTH | Age: 75
End: 2025-04-09
Payer: COMMERCIAL

## 2025-04-09 ENCOUNTER — HOME INFUSION BILLING (OUTPATIENT)
Dept: HOME HEALTH SERVICES | Facility: HOME HEALTH | Age: 75
End: 2025-04-09
Payer: COMMERCIAL

## 2025-04-10 ENCOUNTER — HOME INFUSION BILLING (OUTPATIENT)
Dept: HOME HEALTH SERVICES | Facility: HOME HEALTH | Age: 75
End: 2025-04-10
Payer: COMMERCIAL

## 2025-04-28 ENCOUNTER — HOME INFUSION BILLING (OUTPATIENT)
Dept: HOME HEALTH SERVICES | Facility: HOME HEALTH | Age: 75
End: 2025-04-28
Payer: COMMERCIAL

## 2025-04-28 ENCOUNTER — HOME INFUSION (OUTPATIENT)
Dept: HOME HEALTH SERVICES | Facility: HOME HEALTH | Age: 75
End: 2025-04-28
Payer: COMMERCIAL

## 2025-04-28 PROCEDURE — S9341 HIT ENTERAL GRAV DIEM: HCPCS

## 2025-04-28 PROCEDURE — B4149 EF BLENDERIZED FOODS: HCPCS

## 2025-04-29 PROCEDURE — S9341 HIT ENTERAL GRAV DIEM: HCPCS

## 2025-04-30 PROCEDURE — S9341 HIT ENTERAL GRAV DIEM: HCPCS

## 2025-05-01 PROCEDURE — A4452 WATERPROOF TAPE: HCPCS

## 2025-05-01 PROCEDURE — S9341 HIT ENTERAL GRAV DIEM: HCPCS

## 2025-05-02 ENCOUNTER — HOME INFUSION (OUTPATIENT)
Dept: HOME HEALTH SERVICES | Facility: HOME HEALTH | Age: 75
End: 2025-05-02
Payer: COMMERCIAL

## 2025-05-02 PROCEDURE — S9341 HIT ENTERAL GRAV DIEM: HCPCS

## 2025-05-03 PROCEDURE — S9341 HIT ENTERAL GRAV DIEM: HCPCS

## 2025-05-04 PROCEDURE — S9341 HIT ENTERAL GRAV DIEM: HCPCS

## 2025-05-05 ENCOUNTER — HOME INFUSION BILLING (OUTPATIENT)
Dept: HOME HEALTH SERVICES | Facility: HOME HEALTH | Age: 75
End: 2025-05-05
Payer: COMMERCIAL

## 2025-05-05 PROCEDURE — S9341 HIT ENTERAL GRAV DIEM: HCPCS

## 2025-05-06 PROCEDURE — S9341 HIT ENTERAL GRAV DIEM: HCPCS

## 2025-05-07 PROCEDURE — S9341 HIT ENTERAL GRAV DIEM: HCPCS

## 2025-05-08 PROCEDURE — S9341 HIT ENTERAL GRAV DIEM: HCPCS

## 2025-05-09 PROCEDURE — S9341 HIT ENTERAL GRAV DIEM: HCPCS

## 2025-05-10 PROCEDURE — S9341 HIT ENTERAL GRAV DIEM: HCPCS

## 2025-05-11 PROCEDURE — S9341 HIT ENTERAL GRAV DIEM: HCPCS

## 2025-05-12 PROCEDURE — S9341 HIT ENTERAL GRAV DIEM: HCPCS

## 2025-05-13 PROCEDURE — S9341 HIT ENTERAL GRAV DIEM: HCPCS

## 2025-05-14 PROCEDURE — S9341 HIT ENTERAL GRAV DIEM: HCPCS

## 2025-05-15 PROCEDURE — S9341 HIT ENTERAL GRAV DIEM: HCPCS

## 2025-05-16 PROCEDURE — S9341 HIT ENTERAL GRAV DIEM: HCPCS

## 2025-05-17 PROCEDURE — S9341 HIT ENTERAL GRAV DIEM: HCPCS

## 2025-05-18 PROCEDURE — S9341 HIT ENTERAL GRAV DIEM: HCPCS

## 2025-05-19 PROCEDURE — S9341 HIT ENTERAL GRAV DIEM: HCPCS

## 2025-05-20 PROCEDURE — S9341 HIT ENTERAL GRAV DIEM: HCPCS

## 2025-05-21 PROCEDURE — S9341 HIT ENTERAL GRAV DIEM: HCPCS

## 2025-05-22 PROCEDURE — S9341 HIT ENTERAL GRAV DIEM: HCPCS

## 2025-05-23 PROCEDURE — S9341 HIT ENTERAL GRAV DIEM: HCPCS

## 2025-05-24 PROCEDURE — S9341 HIT ENTERAL GRAV DIEM: HCPCS

## 2025-05-25 PROCEDURE — S9341 HIT ENTERAL GRAV DIEM: HCPCS

## 2025-05-26 PROCEDURE — S9341 HIT ENTERAL GRAV DIEM: HCPCS

## 2025-05-27 ENCOUNTER — HOME INFUSION (OUTPATIENT)
Dept: HOME HEALTH SERVICES | Facility: HOME HEALTH | Age: 75
End: 2025-05-27
Payer: COMMERCIAL

## 2025-05-27 ENCOUNTER — HOME INFUSION BILLING (OUTPATIENT)
Dept: HOME HEALTH SERVICES | Facility: HOME HEALTH | Age: 75
End: 2025-05-27
Payer: COMMERCIAL

## 2025-05-27 PROCEDURE — S9341 HIT ENTERAL GRAV DIEM: HCPCS

## 2025-05-27 PROCEDURE — B4149 EF BLENDERIZED FOODS: HCPCS

## 2025-05-28 PROCEDURE — S9341 HIT ENTERAL GRAV DIEM: HCPCS

## 2025-05-29 PROCEDURE — S9341 HIT ENTERAL GRAV DIEM: HCPCS

## 2025-05-29 PROCEDURE — A4452 WATERPROOF TAPE: HCPCS

## 2025-05-30 PROCEDURE — S9341 HIT ENTERAL GRAV DIEM: HCPCS

## 2025-05-31 PROCEDURE — S9341 HIT ENTERAL GRAV DIEM: HCPCS

## 2025-06-01 PROCEDURE — S9341 HIT ENTERAL GRAV DIEM: HCPCS

## 2025-06-02 ENCOUNTER — HOME INFUSION (OUTPATIENT)
Dept: HOME HEALTH SERVICES | Facility: HOME HEALTH | Age: 75
End: 2025-06-02
Payer: COMMERCIAL

## 2025-06-02 PROCEDURE — S9341 HIT ENTERAL GRAV DIEM: HCPCS

## 2025-06-03 ENCOUNTER — HOME INFUSION BILLING (OUTPATIENT)
Dept: HOME HEALTH SERVICES | Facility: HOME HEALTH | Age: 75
End: 2025-06-03
Payer: COMMERCIAL

## 2025-06-03 PROCEDURE — S9341 HIT ENTERAL GRAV DIEM: HCPCS

## 2025-06-04 PROCEDURE — S9341 HIT ENTERAL GRAV DIEM: HCPCS

## 2025-06-05 PROCEDURE — S9341 HIT ENTERAL GRAV DIEM: HCPCS

## 2025-06-06 PROCEDURE — S9341 HIT ENTERAL GRAV DIEM: HCPCS

## 2025-06-07 PROCEDURE — S9341 HIT ENTERAL GRAV DIEM: HCPCS

## 2025-06-08 PROCEDURE — S9341 HIT ENTERAL GRAV DIEM: HCPCS

## 2025-06-09 PROCEDURE — S9341 HIT ENTERAL GRAV DIEM: HCPCS

## 2025-06-10 PROCEDURE — S9341 HIT ENTERAL GRAV DIEM: HCPCS

## 2025-06-11 PROCEDURE — S9341 HIT ENTERAL GRAV DIEM: HCPCS

## 2025-06-12 PROCEDURE — S9341 HIT ENTERAL GRAV DIEM: HCPCS

## 2025-06-13 PROCEDURE — S9341 HIT ENTERAL GRAV DIEM: HCPCS

## 2025-06-14 PROCEDURE — S9341 HIT ENTERAL GRAV DIEM: HCPCS

## 2025-06-15 PROCEDURE — S9341 HIT ENTERAL GRAV DIEM: HCPCS

## 2025-06-16 PROCEDURE — S9341 HIT ENTERAL GRAV DIEM: HCPCS

## 2025-06-17 PROCEDURE — S9341 HIT ENTERAL GRAV DIEM: HCPCS

## 2025-06-18 PROCEDURE — S9341 HIT ENTERAL GRAV DIEM: HCPCS

## 2025-06-19 ENCOUNTER — HOME INFUSION (OUTPATIENT)
Dept: HOME HEALTH SERVICES | Facility: HOME HEALTH | Age: 75
End: 2025-06-19
Payer: COMMERCIAL

## 2025-06-19 ENCOUNTER — HOME INFUSION BILLING (OUTPATIENT)
Dept: HOME HEALTH SERVICES | Facility: HOME HEALTH | Age: 75
End: 2025-06-19
Payer: COMMERCIAL

## 2025-06-19 PROCEDURE — S9341 HIT ENTERAL GRAV DIEM: HCPCS

## 2025-06-20 PROCEDURE — S9341 HIT ENTERAL GRAV DIEM: HCPCS

## 2025-06-20 PROCEDURE — B4149 EF BLENDERIZED FOODS: HCPCS

## 2025-06-21 PROCEDURE — S9341 HIT ENTERAL GRAV DIEM: HCPCS

## 2025-06-22 PROCEDURE — S9341 HIT ENTERAL GRAV DIEM: HCPCS

## 2025-06-23 PROCEDURE — S9341 HIT ENTERAL GRAV DIEM: HCPCS

## 2025-06-24 PROCEDURE — S9341 HIT ENTERAL GRAV DIEM: HCPCS

## 2025-06-25 PROCEDURE — S9341 HIT ENTERAL GRAV DIEM: HCPCS

## 2025-06-26 PROCEDURE — S9341 HIT ENTERAL GRAV DIEM: HCPCS

## 2025-06-27 PROCEDURE — S9341 HIT ENTERAL GRAV DIEM: HCPCS

## 2025-06-28 PROCEDURE — S9341 HIT ENTERAL GRAV DIEM: HCPCS

## 2025-06-29 PROCEDURE — S9341 HIT ENTERAL GRAV DIEM: HCPCS

## 2025-06-30 PROCEDURE — S9341 HIT ENTERAL GRAV DIEM: HCPCS

## 2025-07-01 ENCOUNTER — HOME INFUSION (OUTPATIENT)
Dept: HOME HEALTH SERVICES | Facility: HOME HEALTH | Age: 75
End: 2025-07-01
Payer: COMMERCIAL

## 2025-07-01 PROCEDURE — S9341 HIT ENTERAL GRAV DIEM: HCPCS

## 2025-07-02 ENCOUNTER — HOME INFUSION BILLING (OUTPATIENT)
Dept: HOME HEALTH SERVICES | Facility: HOME HEALTH | Age: 75
End: 2025-07-02
Payer: COMMERCIAL

## 2025-07-02 PROCEDURE — S9341 HIT ENTERAL GRAV DIEM: HCPCS

## 2025-07-03 PROCEDURE — S9341 HIT ENTERAL GRAV DIEM: HCPCS

## 2025-07-04 PROCEDURE — S9341 HIT ENTERAL GRAV DIEM: HCPCS

## 2025-07-05 PROCEDURE — S9341 HIT ENTERAL GRAV DIEM: HCPCS

## 2025-07-06 PROCEDURE — S9341 HIT ENTERAL GRAV DIEM: HCPCS

## 2025-07-07 PROCEDURE — S9341 HIT ENTERAL GRAV DIEM: HCPCS

## 2025-07-08 PROCEDURE — S9341 HIT ENTERAL GRAV DIEM: HCPCS

## 2025-07-09 PROCEDURE — S9341 HIT ENTERAL GRAV DIEM: HCPCS

## 2025-07-10 PROCEDURE — S9341 HIT ENTERAL GRAV DIEM: HCPCS

## 2025-07-11 PROCEDURE — S9341 HIT ENTERAL GRAV DIEM: HCPCS

## 2025-07-12 PROCEDURE — S9341 HIT ENTERAL GRAV DIEM: HCPCS

## 2025-07-13 PROCEDURE — S9341 HIT ENTERAL GRAV DIEM: HCPCS

## 2025-07-14 PROCEDURE — S9341 HIT ENTERAL GRAV DIEM: HCPCS

## 2025-07-15 ENCOUNTER — HOME INFUSION (OUTPATIENT)
Dept: HOME HEALTH SERVICES | Facility: HOME HEALTH | Age: 75
End: 2025-07-15
Payer: MEDICAID

## 2025-07-15 PROCEDURE — S9341 HIT ENTERAL GRAV DIEM: HCPCS

## 2025-07-16 PROCEDURE — S9341 HIT ENTERAL GRAV DIEM: HCPCS

## 2025-07-17 PROCEDURE — S9341 HIT ENTERAL GRAV DIEM: HCPCS

## 2025-07-18 PROCEDURE — S9341 HIT ENTERAL GRAV DIEM: HCPCS

## 2025-07-19 PROCEDURE — S9341 HIT ENTERAL GRAV DIEM: HCPCS

## 2025-07-20 PROCEDURE — S9341 HIT ENTERAL GRAV DIEM: HCPCS

## 2025-07-21 ENCOUNTER — HOME INFUSION BILLING (OUTPATIENT)
Dept: HOME HEALTH SERVICES | Facility: HOME HEALTH | Age: 75
End: 2025-07-21
Payer: MEDICARE

## 2025-07-21 PROCEDURE — B4149 EF BLENDERIZED FOODS: HCPCS

## 2025-07-21 PROCEDURE — B4036 ENTERAL FEED SUP KIT GRAV BY: HCPCS

## 2025-07-21 PROCEDURE — S9341 HIT ENTERAL GRAV DIEM: HCPCS

## 2025-07-22 PROCEDURE — B4036 ENTERAL FEED SUP KIT GRAV BY: HCPCS

## 2025-07-22 PROCEDURE — A6402 STERILE GAUZE <= 16 SQ IN: HCPCS

## 2025-07-22 PROCEDURE — S9341 HIT ENTERAL GRAV DIEM: HCPCS

## 2025-07-22 PROCEDURE — A4452 WATERPROOF TAPE: HCPCS

## 2025-07-23 PROCEDURE — S9341 HIT ENTERAL GRAV DIEM: HCPCS

## 2025-07-23 PROCEDURE — B4036 ENTERAL FEED SUP KIT GRAV BY: HCPCS

## 2025-07-24 ENCOUNTER — HOME INFUSION (OUTPATIENT)
Dept: HOME HEALTH SERVICES | Facility: HOME HEALTH | Age: 75
End: 2025-07-24
Payer: MEDICAID

## 2025-07-24 PROCEDURE — S9341 HIT ENTERAL GRAV DIEM: HCPCS

## 2025-07-24 PROCEDURE — B4036 ENTERAL FEED SUP KIT GRAV BY: HCPCS

## 2025-07-25 PROCEDURE — S9341 HIT ENTERAL GRAV DIEM: HCPCS

## 2025-07-25 PROCEDURE — B4036 ENTERAL FEED SUP KIT GRAV BY: HCPCS

## 2025-07-26 PROCEDURE — S9341 HIT ENTERAL GRAV DIEM: HCPCS

## 2025-07-26 PROCEDURE — B4036 ENTERAL FEED SUP KIT GRAV BY: HCPCS

## 2025-07-27 PROCEDURE — B4036 ENTERAL FEED SUP KIT GRAV BY: HCPCS

## 2025-07-28 ENCOUNTER — HOME INFUSION BILLING (OUTPATIENT)
Dept: HOME HEALTH SERVICES | Facility: HOME HEALTH | Age: 75
End: 2025-07-28
Payer: MEDICARE

## 2025-07-28 PROCEDURE — B4036 ENTERAL FEED SUP KIT GRAV BY: HCPCS

## 2025-07-29 PROCEDURE — B4036 ENTERAL FEED SUP KIT GRAV BY: HCPCS

## 2025-07-30 PROCEDURE — B4036 ENTERAL FEED SUP KIT GRAV BY: HCPCS

## 2025-07-31 PROCEDURE — B4036 ENTERAL FEED SUP KIT GRAV BY: HCPCS

## 2025-08-01 PROCEDURE — B4036 ENTERAL FEED SUP KIT GRAV BY: HCPCS

## 2025-08-02 PROCEDURE — B4036 ENTERAL FEED SUP KIT GRAV BY: HCPCS

## 2025-08-03 PROCEDURE — B4036 ENTERAL FEED SUP KIT GRAV BY: HCPCS

## 2025-08-04 PROCEDURE — B4036 ENTERAL FEED SUP KIT GRAV BY: HCPCS

## 2025-08-05 PROCEDURE — B4036 ENTERAL FEED SUP KIT GRAV BY: HCPCS

## 2025-08-06 PROCEDURE — B4036 ENTERAL FEED SUP KIT GRAV BY: HCPCS

## 2025-08-07 PROCEDURE — B4036 ENTERAL FEED SUP KIT GRAV BY: HCPCS

## 2025-08-08 PROCEDURE — B4036 ENTERAL FEED SUP KIT GRAV BY: HCPCS

## 2025-08-09 PROCEDURE — B4036 ENTERAL FEED SUP KIT GRAV BY: HCPCS

## 2025-08-10 PROCEDURE — B4036 ENTERAL FEED SUP KIT GRAV BY: HCPCS

## 2025-08-11 ENCOUNTER — HOME INFUSION (OUTPATIENT)
Dept: HOME HEALTH SERVICES | Facility: HOME HEALTH | Age: 75
End: 2025-08-11
Payer: MEDICAID

## 2025-08-11 ENCOUNTER — MEDICAL CORRESPONDENCE (OUTPATIENT)
Dept: HOME HEALTH SERVICES | Facility: HOME HEALTH | Age: 75
End: 2025-08-11
Payer: MEDICAID

## 2025-08-11 DIAGNOSIS — R62.7 ADULT FAILURE TO THRIVE: ICD-10-CM

## 2025-08-11 PROCEDURE — B4036 ENTERAL FEED SUP KIT GRAV BY: HCPCS

## 2025-08-11 RX ORDER — NUTRITIONAL SUPPLEMENT/FIBER
300 LIQUID (ML) ORAL
Qty: 45000 ML | Refills: 11 | Status: ACTIVE | OUTPATIENT
Start: 2025-08-11 | End: 2026-08-11

## 2025-08-12 PROCEDURE — B4036 ENTERAL FEED SUP KIT GRAV BY: HCPCS

## 2025-08-13 ENCOUNTER — HOME INFUSION BILLING (OUTPATIENT)
Dept: HOME HEALTH SERVICES | Facility: HOME HEALTH | Age: 75
End: 2025-08-13
Payer: MEDICARE

## 2025-08-13 PROCEDURE — B4036 ENTERAL FEED SUP KIT GRAV BY: HCPCS

## 2025-08-14 PROCEDURE — B4036 ENTERAL FEED SUP KIT GRAV BY: HCPCS

## 2025-08-15 PROCEDURE — B4036 ENTERAL FEED SUP KIT GRAV BY: HCPCS

## 2025-08-16 PROCEDURE — B4036 ENTERAL FEED SUP KIT GRAV BY: HCPCS

## 2025-08-17 PROCEDURE — B4036 ENTERAL FEED SUP KIT GRAV BY: HCPCS

## 2025-08-18 PROCEDURE — B4036 ENTERAL FEED SUP KIT GRAV BY: HCPCS

## 2025-08-19 PROCEDURE — B4036 ENTERAL FEED SUP KIT GRAV BY: HCPCS
